# Patient Record
Sex: MALE | Race: BLACK OR AFRICAN AMERICAN | NOT HISPANIC OR LATINO | Employment: FULL TIME | URBAN - METROPOLITAN AREA
[De-identification: names, ages, dates, MRNs, and addresses within clinical notes are randomized per-mention and may not be internally consistent; named-entity substitution may affect disease eponyms.]

---

## 2019-11-13 ENCOUNTER — COMMUNICATION - HEALTHEAST (OUTPATIENT)
Dept: TELEHEALTH | Facility: CLINIC | Age: 19
End: 2019-11-13

## 2019-11-13 ENCOUNTER — COMMUNICATION - HEALTHEAST (OUTPATIENT)
Dept: SCHEDULING | Facility: CLINIC | Age: 19
End: 2019-11-13

## 2019-11-13 ENCOUNTER — OFFICE VISIT - HEALTHEAST (OUTPATIENT)
Dept: INTERNAL MEDICINE | Facility: CLINIC | Age: 19
End: 2019-11-13

## 2019-11-13 DIAGNOSIS — Z72.0 TOBACCO ABUSE: ICD-10-CM

## 2019-11-13 DIAGNOSIS — R06.2 WHEEZING: ICD-10-CM

## 2019-11-13 DIAGNOSIS — B30.9 ACUTE VIRAL CONJUNCTIVITIS OF BOTH EYES: ICD-10-CM

## 2019-11-13 RX ORDER — ALBUTEROL SULFATE 90 UG/1
2 AEROSOL, METERED RESPIRATORY (INHALATION) EVERY 6 HOURS PRN
Qty: 1 EACH | Refills: 3 | Status: SHIPPED | OUTPATIENT
Start: 2019-11-13

## 2019-11-13 ASSESSMENT — MIFFLIN-ST. JEOR: SCORE: 1943.6

## 2019-11-14 ENCOUNTER — COMMUNICATION - HEALTHEAST (OUTPATIENT)
Dept: SCHEDULING | Facility: CLINIC | Age: 19
End: 2019-11-14

## 2020-12-08 ENCOUNTER — OFFICE VISIT - HEALTHEAST (OUTPATIENT)
Dept: FAMILY MEDICINE | Facility: CLINIC | Age: 20
End: 2020-12-08

## 2020-12-08 ENCOUNTER — COMMUNICATION - HEALTHEAST (OUTPATIENT)
Dept: TELEHEALTH | Facility: CLINIC | Age: 20
End: 2020-12-08

## 2020-12-08 DIAGNOSIS — F41.9 ANXIETY: ICD-10-CM

## 2020-12-08 DIAGNOSIS — B08.4 HAND, FOOT AND MOUTH DISEASE: ICD-10-CM

## 2020-12-08 DIAGNOSIS — K13.79 RECURRENT ORAL ULCERS: ICD-10-CM

## 2020-12-08 RX ORDER — ESCITALOPRAM OXALATE 10 MG/1
10 TABLET ORAL DAILY
Qty: 30 TABLET | Refills: 5 | Status: SHIPPED | OUTPATIENT
Start: 2020-12-08

## 2021-05-27 ENCOUNTER — RECORDS - HEALTHEAST (OUTPATIENT)
Dept: ADMINISTRATIVE | Facility: CLINIC | Age: 21
End: 2021-05-27

## 2021-05-28 ENCOUNTER — RECORDS - HEALTHEAST (OUTPATIENT)
Dept: ADMINISTRATIVE | Facility: CLINIC | Age: 21
End: 2021-05-28

## 2021-05-30 ENCOUNTER — RECORDS - HEALTHEAST (OUTPATIENT)
Dept: ADMINISTRATIVE | Facility: CLINIC | Age: 21
End: 2021-05-30

## 2021-06-03 VITALS
HEIGHT: 75 IN | OXYGEN SATURATION: 97 % | TEMPERATURE: 99.3 F | WEIGHT: 192 LBS | DIASTOLIC BLOOD PRESSURE: 90 MMHG | HEART RATE: 100 BPM | BODY MASS INDEX: 23.87 KG/M2 | SYSTOLIC BLOOD PRESSURE: 134 MMHG

## 2021-06-03 NOTE — TELEPHONE ENCOUNTER
Status update  - worsening sores from yesterday       Was seen yesterday in clinic     1-2 sores in mouth were noted at the time      This morning worsening sores in mouth + new onset of sores in the genital area       Has been using some ice in mouth with some help there          A/P:   > Should be re-seen today for eval of this   > Agree with ICE as needed for mouth sores if juanis painful         Tele# [phone]                 Reason for Disposition    4 or more ulcers    Protocols used: MOUTH ULCERS-A-AH

## 2021-06-03 NOTE — PATIENT INSTRUCTIONS - HE
Viral versus allergic conjunctivitis, acute, involving both eyes, symptom onset approximately 30 hours ago.    He reported a prodromal illness of about a week of throat irritation, coughing, and nasal congestion.  Then, starting the evening of Monday, November 11 about 36 hours ago, he began to feel itching and scratchiness of the eyes.  Then at 4 AM the morning of Tuesday, November 12, he was awoken by severe itching, burning, and redness involving both eyes, accompanied by severe tearing.    Today Wednesday, November 13, he has more of the same, also note that he has a low-grade temperature of 99.3  F.  Also still experiencing some throat scratching and a bit of cough.    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  He asked his boss whether any coworkers are sick with similar symptoms, and there do not seem to be any.    On examination now, he has striking conjunctival redness, but it is concentrated along the periphery, not in a ciliary flush pattern.  He has preserved visual acuity, and wears pretty strong eyeglasses for myopia. I do not see any purulence in the eyes.    I told him that I believe he has conjunctivitis that is either viral or allergic in origin.  I cannot rule out a chemical exposure, because the clinical appearance would be nearly identical.  However, the history does not seem to suggest a new chemical exposure.  I told himthat if this is viral conjunctivitis, that virus could be contagious.  In particular, he should avoid contact with babies and children.    I am prescribing for him azelastine antihistamine eyedrops 1 drop in each eye twice a day.    I asked him to apply cool compresses to his eye with a clean, moistened, chilled washcloth.  Do that as much as possible, which will help with the swelling and irritation    I told him to stop  using the over-the-counter Visine (tetrahydrozoline) that he had started using about a day ago.  I told him to take the next couple days off work.    If he is not getting better over the next 2 days, I would ask him to see an eye specialist.    2.  Wheezing heard in the left lung field, he recalls being told about childhood chronic bronchitis.  I am going to prescribe for him albuterol metered-dose inhaler.    He does smoke less than half a pack a day; I did urge him to stop as soon as possible.

## 2021-06-03 NOTE — TELEPHONE ENCOUNTER
Triage note:    19 year old male called with concerns about bilateral eye itching, swelling, dryness, blurriness and redness since Monday evening.      He reports that he may have come in contact with a chemical at work. In addition, Monday evening it felt like the back of his throat was swelling up but not other complaints about that.  His issue is with both eyes.  He does report blurry vision at times and that his eyes are watering a lot. He attempted to rinse them out but it didn't help.  No other symptoms.    RN triaged to be seen in office now.  He agreed.  Patient warm transferred to scheduling for appointment.  He is scheduled at 1 pm with Dr. Linder at [facility] clinic.    Shawna Romano RN, [facility] Refill/Triage, 11/13/2019 10:44 AM      Reason for Disposition    Blurred vision that persists >1 hour after irrigation    Protocols used: EYE - CHEMICAL IN-A-[place]

## 2021-06-03 NOTE — PROGRESS NOTES
Office Visit - New Patient   Baldo Gardner   19 y.o.  male    Date of visit: 11/13/2019  Physician: Luiz Linder MD     Assessment and Plan     Viral versus allergic conjunctivitis, acute, involving both eyes, symptom onset approximately 30 hours ago.    He reported a prodromal illness of about a week of throat irritation, coughing, and nasal congestion.  Then, starting the evening of Monday, November 11 about 36 hours ago, he began to feel itching and scratchiness of the eyes.  Then at 4 AM the morning of Tuesday, November 12, he was awoken by severe itching, burning, and redness involving both eyes, accompanied by severe tearing.    Today Wednesday, November 13, he has more of the same, also note that he has a low-grade temperature of 99.3  F.  Also still experiencing some throat scratching and a bit of cough.    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  He asked his boss whether any coworkers are sick with similar symptoms, and there do not seem to be any.    On examination now, he has striking conjunctival redness, but it is concentrated along the periphery, not in a ciliary flush pattern.  He has preserved visual acuity, and wears pretty strong eyeglasses for myopia. I do not see any purulence in the eyes.    I told him that I believe he has conjunctivitis that is either viral or allergic in origin.  I cannot rule out a chemical exposure, because the clinical appearance would be nearly identical.  However, the history does not seem to suggest a new chemical exposure.  I told himthat if this is viral conjunctivitis, that virus could be contagious.  In particular, he should avoid contact with babies and children.    I am prescribing for him azelastine antihistamine eyedrops 1 drop in each eye twice a day.    I asked him to apply cool compresses  to his eye with a clean, moistened, chilled washcloth.  Do that as much as possible, which will help with the swelling and irritation    I told him to stop using the over-the-counter Visine (tetrahydrozoline) that he had started using about a day ago.  I told him to take the next couple days off work.    If he is not getting better over the next 2 days, I would ask him to see an eye specialist.    2.  Wheezing heard in the left lung field, he recalls being told about childhood chronic bronchitis.  I am going to prescribe for him albuterol metered-dose inhaler.    He does smoke less than half a pack a day; I did urge him to stop as soon as possible.         Chief Complaint   Chief Complaint   Patient presents with     Eye Problem     Was exposed to chemicals at work and now both eyes are red, swollen, burning. Started yesterday.  Sore throat also        History of Present Illness   This 19 y.o. old comes for evaluation of severe bilateral eye irritation, redness, and itching, onset about 36 hours ago    He reported a prodromal illness of about a week of throat irritation, coughing, and nasal congestion.  Then, starting the evening of Monday, November 11 about 36 hours ago, he began to feel itching and scratchiness of the eyes.  Then at 4 AM the morning of Tuesday, November 12, he was awoken by severe itching, burning, and redness involving both eyes, accompanied by severe tearing.    Today Wednesday, November 13, he has more of the same, also note that he has a low-grade temperature of 99.3  F.  Also still experiencing some throat scratching and a bit of cough.    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  He asked his boss whether any coworkers are sick with similar symptoms, and there do not seem to be any.    Review of Systems: A comprehensive review of  "systems was negative except as noted.     Medications and Allergies   Current Outpatient Medications   Medication Sig Dispense Refill     azelastine (OPTIVAR) 0.05 % ophthalmic solution I drop in each eye twice a day 6 mL 3     No current facility-administered medications for this visit.      No Known Allergies     Family and Social History   Family History   Problem Relation Age of Onset     Cancer Mother      Heart attack Father         Social History     Tobacco Use     Smoking status: Current Every Day Smoker     Smokeless tobacco: Never Used   Substance Use Topics     Alcohol use: Not on file     Drug use: Not on file        Physical Exam   General Appearance: Very pleasant, does not look systemically ill, but he has strikingly injected conjunctivae    /90 (Patient Site: Left Arm, Patient Position: Sitting, Cuff Size: Adult Large)   Pulse 100   Temp 99.3  F (37.4  C) (Oral)   Ht 6' 2.5\" (1.892 m)   Wt 192 lb (87.1 kg)   SpO2 97%   BMI 24.32 kg/m      Positives marked with (+)  General: Alert, in no distress  Skin: No significant lesion seen.  Eyes/nose/throat:   + strikingly injected conjunctivae, more concentrated in the periphery, not ciliary flush.  Mild swelling of eyelids. copious tearing, no purulence seen.  Pupils were symmetrical and reactive. Fundoscopic exam unremarkable  oropharynx clear, ears with normal TM's  MSK: Neck with good ROM  Lymphatic: Neck without adenopathy or masses  Endocrine: Thyroid with no nodules to palpation  Pulm: + Slight wheeze heard in the left midlung field  Cardiac: Heart with regular rate and rhythm, no murmur or gallop  GI: Abdomen soft, nontender. No palpable enlargement of liver or spleen  MSK: Extremities no tenderness or edema  Neuro: Moves all extremities, without focal weakness  Psych: Alert, normal mental status. Normal affect and speech       Additional Information        Luiz Linder MD  Internal Medicine    "

## 2021-06-05 VITALS
WEIGHT: 205.31 LBS | OXYGEN SATURATION: 96 % | BODY MASS INDEX: 26.36 KG/M2 | SYSTOLIC BLOOD PRESSURE: 122 MMHG | DIASTOLIC BLOOD PRESSURE: 62 MMHG | HEART RATE: 61 BPM
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
## 2021-06-16 PROBLEM — R21 SKIN RASH: Status: ACTIVE | Noted: 2019-11-16

## 2021-06-16 PROBLEM — H10.013 ACUTE FOLLICULAR CONJUNCTIVITIS OF BOTH EYES: Status: ACTIVE | Noted: 2019-11-26

## 2021-06-16 PROBLEM — L51.1: Status: ACTIVE | Noted: 2019-11-18

## 2021-06-16 PROBLEM — R21 RASH AND NONSPECIFIC SKIN ERUPTION: Status: ACTIVE | Noted: 2019-11-16

## 2021-06-16 PROBLEM — Z72.0 TOBACCO ABUSE: Status: ACTIVE | Noted: 2019-11-13

## 2021-06-19 NOTE — LETTER
Letter by Luiz Linder MD at      Author: Luiz Linder MD Service: -- Author Type: --    Filed:  Encounter Date: 11/13/2019 Status: Signed         November 13, 2019     Patient: Baldo Gardner   YOB: 2000   Date of Visit: 11/13/2019       To Whom It May Concern:    It is my medical opinion that Baldo Gardner should remain out of work until Monday November 18, 2019.    If you have any questions or concerns, please don't hesitate to call.    Sincerely,        Electronically signed by Luiz Linder MD

## 2022-04-07 ENCOUNTER — HOSPITAL ENCOUNTER (EMERGENCY)
Facility: CLINIC | Age: 22
Discharge: HOME | End: 2022-04-07
Attending: EMERGENCY MEDICINE | Admitting: EMERGENCY MEDICINE
Payer: COMMERCIAL

## 2022-04-07 VITALS
HEART RATE: 58 BPM | RESPIRATION RATE: 16 BRPM | WEIGHT: 190 LBS | DIASTOLIC BLOOD PRESSURE: 63 MMHG | TEMPERATURE: 98.1 F | SYSTOLIC BLOOD PRESSURE: 125 MMHG | OXYGEN SATURATION: 98 % | BODY MASS INDEX: 24.39 KG/M2

## 2022-04-07 DIAGNOSIS — K08.89 PAIN, DENTAL: ICD-10-CM

## 2022-04-07 PROCEDURE — 99283 EMERGENCY DEPT VISIT LOW MDM: CPT

## 2022-04-07 RX ORDER — PENICILLIN V POTASSIUM 500 MG/1
500 TABLET, FILM COATED ORAL 4 TIMES DAILY
Qty: 28 TABLET | Refills: 0 | Status: SHIPPED | OUTPATIENT
Start: 2022-04-07 | End: 2022-04-14
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
## 2022-04-08 ENCOUNTER — PATIENT OUTREACH (OUTPATIENT)
Dept: CARE COORDINATION | Facility: CLINIC | Age: 22
End: 2022-04-08
Payer: COMMERCIAL

## 2022-04-08 DIAGNOSIS — Z71.89 OTHER SPECIFIED COUNSELING: ICD-10-CM
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
## 2022-04-30 ENCOUNTER — HOSPITAL ENCOUNTER (EMERGENCY)
Facility: CLINIC | Age: 22
Discharge: HOME | End: 2022-04-30
Attending: EMERGENCY MEDICINE | Admitting: EMERGENCY MEDICINE
Payer: COMMERCIAL

## 2022-04-30 VITALS
DIASTOLIC BLOOD PRESSURE: 84 MMHG | HEART RATE: 75 BPM | WEIGHT: 191.8 LBS | OXYGEN SATURATION: 100 % | SYSTOLIC BLOOD PRESSURE: 141 MMHG | BODY MASS INDEX: 24.63 KG/M2 | TEMPERATURE: 98 F | RESPIRATION RATE: 22 BRPM

## 2022-04-30 DIAGNOSIS — K08.89 PAIN, DENTAL: ICD-10-CM

## 2022-04-30 PROCEDURE — 250N000009 HC RX 250: Performed by: EMERGENCY MEDICINE

## 2022-04-30 PROCEDURE — 64400 NJX AA&/STRD TRIGEMINAL NRV: CPT

## 2022-04-30 PROCEDURE — 99283 EMERGENCY DEPT VISIT LOW MDM: CPT

## 2022-04-30 RX ORDER — BUPIVACAINE HYDROCHLORIDE AND EPINEPHRINE 5; 5 MG/ML; UG/ML
1.8 INJECTION, SOLUTION PERINEURAL ONCE
Status: COMPLETED | OUTPATIENT
Start: 2022-04-30 | End: 2022-04-30

## 2022-04-30 RX ORDER — NAPROXEN 500 MG/1
500 TABLET ORAL 2 TIMES DAILY WITH MEALS
Qty: 10 TABLET | Refills: 0 | Status: SHIPPED | OUTPATIENT
Start: 2022-04-30 | End: 2022-05-05

## 2022-04-30 RX ADMIN — BUPIVACAINE HYDROCHLORIDE AND EPINEPHRINE BITARTRATE 1.8 ML: 5; .005 INJECTION, SOLUTION SUBCUTANEOUS at 20:05

## 2022-05-01 NOTE — ED TRIAGE NOTES
Pt presents with left upper dental pain beginning today. He went to emergency dental this AM where he had some procedure done that initially helped but worsened when the anesthetic wore off.     Triage Assessment     Row Name 04/30/22 1934       Triage Assessment (Adult)    Airway WDL WDL       Respiratory WDL    Respiratory WDL WDL       Skin Circulation/Temperature WDL    Skin Circulation/Temperature WDL WDL       Cardiac WDL    Cardiac WDL WDL       Peripheral/Neurovascular WDL    Peripheral Neurovascular WDL WDL       Cognitive/Neuro/Behavioral WDL    Cognitive/Neuro/Behavioral WDL WDL

## 2022-05-01 NOTE — ED PROVIDER NOTES
EMERGENCY DEPARTMENT ENCOUNTER      NAME: Baldo Gardner  AGE: 21 year old male  YOB: 2000  MRN: [id number]  EVALUATION DATE & TIME: 4/30/2022  7:38 PM    PCP: Mae Kyle    ED PROVIDER: Steven Pratt M.D.      Chief Complaint   Patient presents with     Dental Pain         FINAL IMPRESSION:  1. Pain, dental          ED COURSE & MEDICAL DECISION MAKING:    Pertinent Labs & Imaging studies reviewed. (See chart for details)  21 year old male presents to the Emergency Department for evaluation of dental pain. Patient appears non toxic with stable vitals signs, patient is afebrile with no tachycardia or hypoxia, no increased work of breathing. Overall exam is benign.  Lungs are clear and abdomen is benign.  Exam significant for focal tenderness to tooth #14 without surrounding gingival fluctuance or swelling, certainly nothing to suggest acute dental trauma or drainable dental abscess.  Patient had Recently placed over the tooth, I noted no other dental trauma or fractures, no bleeding.  He denies any recent falls or trauma with certainly nothing to suggest facial bone fracture or dislocation.  Nothing to suggest Gopi's angina, facial cellulitis or other more malicious etiology of symptoms.  No indication for emergent labs or imaging studies based on reassuring vitals and otherwise benign exam.  Did perform dental block here with improvement in symptoms, patient has penicillin and hydrocodone per his report prescribed to him by the dental clinic.  I recommended that he continue on these medications.  I will also discharge on a course of naproxen, advise no other NSAIDs while taking naproxen and advised the patient follow-up with [facility] or his primary dental clinic in the next 1 or 2 days for continued outpatient management evaluation.  Discussed these recommendations with the patient felt reassured and comfortable discharge.  Return precautions provided.      7:45 PM I  met with the patient to gather history and to perform my initial exam. We discussed plans for the ED course, including diagnostic testing and treatment. PPE: N95 mask, gloves, eye protection  At the conclusion of the encounter I discussed the results of all of the tests and the disposition. The questions were answered and return precautions provided. The patient or family acknowledged understanding and was agreeable with the care plan.         MEDICATIONS GIVEN IN THE EMERGENCY:  Medications   bupivacaine 0.5 % EPINEPHrine 1:200,000 0.5% -1:[id number] dental injection SOLN 1.8 mL (1.8 mLs Intradermal Given by Other Clinician 4/30/22 2005)       NEW PRESCRIPTIONS STARTED AT TODAY'S ER VISIT  Discharge Medication List as of 4/30/2022  8:06 PM      START taking these medications    Details   naproxen (NAPROSYN) 500 MG tablet Take 1 tablet (500 mg) by mouth 2 times daily (with meals) for 5 days, Disp-10 tablet, R-0, Local Print                  =================================================================    HPI    Patient information was obtained from: patient     Use of Intrepreter: N/A         Baldo Gardner is a 21 year old male who presents with left sided dental pain.    Patient reports the sudden onset of upper left dental pain that began this morning when he woke up. Patient went to the emergency dentist this morning at 10 AM where he was given a dental fill, shot, Penicillin, and hydrocodone. His last dose of hydrocodone was about 2 hours ago which has not helped with his pain. Notes his tongue feels swollen. States the left side of his jaw felt swollen earlier but has improved after he treated it with ice. He denies any recent dental trauma. He denies having any dental pain yesterday (4/29/22) or the day before (4/28/22).     Denies chest pain, abdominal pain, vomiting, or changes to his urinary or bowel habits. Denies any surgical history.     Endorses tobacco use but says he has not used it today. Denies  using alcohol or street drugs.       REVIEW OF SYSTEMS   Constitutional:  Positive for left upper dental pain. Positive for tongue swelling. Denies fever, chills  Respiratory:  Denies productive cough or increased work of breathing  Cardiovascular:  Denies chest pain, palpitations  GI:  Denies abdominal pain, nausea, vomiting, or change in bowel or bladder habits   Musculoskeletal:  Denies any new muscle/joint swelling  Skin:  Denies rash   Neurologic:  Denies focal weakness  All systems negative except as marked.     PAST MEDICAL HISTORY:  No past medical history on file.    PAST SURGICAL HISTORY:  No past surgical history on file.      CURRENT MEDICATIONS:    Prior to Admission medications    Medication Sig Start Date End Date Taking? Authorizing Provider   acetaminophen (TYLENOL) 325 mg/10.15 mL Soln [ACETAMINOPHEN (TYLENOL) 325 MG/10.15 ML SOLN] Take 20.3 mL (650 mg total) by mouth every 4 (four) hours as needed. 11/25/19   Iman Avalos APRN CNP   albuterol (PROAIR HFA;PROVENTIL HFA;VENTOLIN HFA) 90 mcg/actuation inhaler [ALBUTEROL (PROAIR HFA;PROVENTIL HFA;VENTOLIN HFA) 90 MCG/ACTUATION INHALER] Inhale 2 puffs every 6 (six) hours as needed for wheezing. 11/13/19   Luiz Linder MD   carboxymethylcellulose (REFRESH PLUS) 0.5 % Dpet ophthalmic dropperette [CARBOXYMETHYLCELLULOSE (REFRESH PLUS) 0.5 % DPET OPHTHALMIC DROPPERETTE] Administer 1 drop to both eyes every hour as needed. 11/26/19   Alfred Gonzalez MD   escitalopram oxalate (LEXAPRO) 10 MG tablet [ESCITALOPRAM OXALATE (LEXAPRO) 10 MG TABLET] Take 1 tablet (10 mg total) by mouth daily. 12/8/20   Mae Kyle MD   ibuprofen (ADVIL,MOTRIN) 200 MG tablet [IBUPROFEN (ADVIL,MOTRIN) 200 MG TABLET] Take 200-400 mg by mouth daily. 11/16/19   Provider, Historical   morphine 0.1% Gel [MORPHINE 0.1% GEL] Apply topically 4 (four) times a day as needed (around mouth, ok to use on open sores on outside of mouth / lips). 11/25/19   Bailey  TALHA Tirado CNP   oxyCODONE (ROXICODONE) 5 mg/5 mL solution [OXYCODONE (ROXICODONE) 5 MG/5 ML SOLUTION] Take 5 mL (5 mg total) by mouth every 6 (six) hours as needed for pain. 11/26/19   Alfred Gonzalez MD   viscous lidocaine HC 2 % Soln viscous solution [VISCOUS LIDOCAINE HC 2 % SOLN VISCOUS SOLUTION] Apply 15 mL to cheek every 4 (four) hours as needed (mouth pain). 11/25/19   Iman Avalos APRN CNP        ALLERGIES:  No Known Allergies    FAMILY HISTORY:  Family History   Problem Relation Age of Onset     Cancer Mother      Coronary Artery Disease Father        SOCIAL HISTORY:   Social History     Socioeconomic History     Marital status: Single   Tobacco Use     Smoking status: Current Some Day Smoker     Types: Cigarettes, Cigarettes     Smokeless tobacco: Never Used   Substance and Sexual Activity     Alcohol use: Yes     Drug use: Yes     Frequency: 7.0 times per week     Types: Marijuana       VITALS:  Patient Vitals for the past 24 hrs:   BP Temp Temp src Pulse Resp SpO2 Weight   04/30/22 2013 (!) 141/84 -- -- 75 22 100 % --   04/30/22 1936 (!) 145/107 98  F (36.7  C) Oral 80 18 99 % 87 kg (191 lb 12.8 oz)        PHYSICAL EXAM    Constitutional:  Awake, alert, in no apparent distress  HENT:  Normocephalic, Atraumatic. Bilateral external ears normal. Oropharynx moist. Focal tenderness with recent dental cap to tooth number 14. No surrounding gingival fluctuance or swelling. No other signs of dental trauma. Nose normal. Neck- Normal range of motion with no guarding, No midline cervical tenderness, Supple, No stridor.   Eyes:  PERRL, EOMI with no signs of entrapment, Conjunctiva normal, No discharge.   Respiratory:  Normal breath sounds, No respiratory distress, No wheezing.    Cardiovascular:  Normal heart rate, Normal rhythm, No appreciable rubs or gallops.   GI:  Soft, No tenderness, No distension, No palpable masses  Musculoskeletal:  Intact distal pulses, No edema. Good range of  motion in all major joints. No tenderness to palpation or major deformities noted.  Integument:  Warm, Dry, No erythema, No rash.   Neurologic:  Alert & oriented, Normal motor function, Normal sensory function, No focal deficits noted.   Psychiatric:  Affect normal, Judgment normal, Mood normal.     LAB:  All pertinent labs reviewed and interpreted.       RADIOLOGY:  No orders to display            PROCEDURES:   PROCEDURE: Dental Nerve Block   INDICATIONS: Dental pain   PROCEDURE PROVIDER: Dr Steven Pratt   SITE: Left Inferior Alveolar (mandibular) Nerve to achieve anesthesia of Tooth #14     MEDICATION: 1.8 mL of 0.5% Bupivacaine with epinephrine   NOTE: The usual maxillary landmarks were identified and the needle was positioned at superior aspect of the tooth, gumline.  Area was aspirated and there was no return of blood.  I then injected the medication into the site.    COMPLICATIONS: Patient tolerated procedure well, without complication           I, Porsche Mcdonald, am serving as a scribe to document services personally performed by Steven Pratt MD, based on my observation and the provider's statements to me. I, Steven Pratt MD attest that Porsche Mcdonald is acting in a scribe capacity, has observed my performance of the services and has documented them in accordance with my direction.    Steven Pratt M.D.  Emergency Medicine  [facility] EMERGENCY ROOM  [address] [address] [phone]  Dept: [phone]     Steven Pratt MD  04/30/22 2027